# Patient Record
Sex: FEMALE | Race: WHITE | NOT HISPANIC OR LATINO | Employment: FULL TIME | URBAN - METROPOLITAN AREA
[De-identification: names, ages, dates, MRNs, and addresses within clinical notes are randomized per-mention and may not be internally consistent; named-entity substitution may affect disease eponyms.]

---

## 2022-05-26 ENCOUNTER — APPOINTMENT (EMERGENCY)
Dept: RADIOLOGY | Facility: HOSPITAL | Age: 47
End: 2022-05-26

## 2022-05-26 ENCOUNTER — HOSPITAL ENCOUNTER (EMERGENCY)
Facility: HOSPITAL | Age: 47
Discharge: HOME | End: 2022-05-26
Attending: EMERGENCY MEDICINE | Admitting: EMERGENCY MEDICINE

## 2022-05-26 VITALS
TEMPERATURE: 98.1 F | HEART RATE: 89 BPM | WEIGHT: 125 LBS | RESPIRATION RATE: 18 BRPM | HEIGHT: 67 IN | BODY MASS INDEX: 19.62 KG/M2 | SYSTOLIC BLOOD PRESSURE: 123 MMHG | OXYGEN SATURATION: 100 % | DIASTOLIC BLOOD PRESSURE: 66 MMHG

## 2022-05-26 DIAGNOSIS — T14.8XXA ANIMAL BITE: Primary | ICD-10-CM

## 2022-05-26 DIAGNOSIS — Z20.3 NEED FOR POST EXPOSURE PROPHYLAXIS FOR RABIES: ICD-10-CM

## 2022-05-26 PROCEDURE — 90471 IMMUNIZATION ADMIN: CPT

## 2022-05-26 PROCEDURE — 90375 RABIES IG IM/SC: CPT

## 2022-05-26 PROCEDURE — 96372 THER/PROPH/DIAG INJ SC/IM: CPT

## 2022-05-26 PROCEDURE — 90715 TDAP VACCINE 7 YRS/> IM: CPT

## 2022-05-26 PROCEDURE — 90472 IMMUNIZATION ADMIN EACH ADD: CPT

## 2022-05-26 PROCEDURE — 99284 EMERGENCY DEPT VISIT MOD MDM: CPT

## 2022-05-26 PROCEDURE — 73552 X-RAY EXAM OF FEMUR 2/>: CPT

## 2022-05-26 PROCEDURE — 90675 RABIES VACCINE IM: CPT

## 2022-05-26 RX ORDER — AMOXICILLIN AND CLAVULANATE POTASSIUM 875; 125 MG/1; MG/1
1 TABLET, FILM COATED ORAL EVERY 12 HOURS
Qty: 20 TABLET | Refills: 0 | Status: SHIPPED | OUTPATIENT
Start: 2022-05-26 | End: 2022-06-05

## 2022-05-26 RX ORDER — AMOXICILLIN AND CLAVULANATE POTASSIUM 875; 125 MG/1; MG/1
1 TABLET, FILM COATED ORAL ONCE
Status: COMPLETED | OUTPATIENT
Start: 2022-05-26 | End: 2022-05-26

## 2022-05-26 RX ADMIN — TETANUS TOXOID, REDUCED DIPHTHERIA TOXOID AND ACELLULAR PERTUSSIS VACCINE, ADSORBED 0.5 ML: 5; 2.5; 8; 8; 2.5 SUSPENSION INTRAMUSCULAR at 20:01

## 2022-05-26 RX ADMIN — AMOXICILLIN AND CLAVULANATE POTASSIUM 1 TABLET: 875; 125 TABLET, FILM COATED ORAL at 20:00

## 2022-05-26 RX ADMIN — Medication 1 ML: at 20:02

## 2022-05-26 RX ADMIN — RABIES IMMUNE GLOBULIN (HUMAN) 1140 UNITS: 300 INJECTION, SOLUTION INFILTRATION; INTRAMUSCULAR at 20:01

## 2022-05-26 NOTE — Clinical Note
Laurence Leei was seen and treated in our emergency department on 5/26/2022  Diagnosis:     Scotty Guzman  may return to work on return date  She may return on this date: 05/30/2022         If you have any questions or concerns, please don't hesitate to call        Whitley Tai PA-C    ______________________________           _______________          _______________  Hospital Representative                              Date                                Time

## 2022-05-26 NOTE — ED PROVIDER NOTES
History  Chief Complaint   Patient presents with    Animal Bite     Pt reports being bitten on R leg on Monday night while she was walking home from work, 4 puncture wounds to inside of R thigh      Patient is a 40year old female presenting to the ED with a complaint of an animal bite that occurred Monday at 10:30pm  Reports she was walking home from work Monday night when she felt something brush up against her posterior left leg then felt a bite to the anterior right thigh  Reports she did not see the animal and does not know what bit her  Reports she went home and cleaned out the wound, applied hydrogen peroxide and salve  Reports she has been cleaning it multiple times each day  Reports she was not seen after the incident because she did not want to miss work  Denies fevers, abdominal pain, nausea, vomiting, chest pain, shortness of breath, difficulty breathing, difficulty ambulating, headaches or weakness  Does not offer any other concerns or complaints  Patient is unaware of last tetanus shot  History provided by:  Patient   used: No    Medical Problem  Location:  Right thigh  Severity:  Mild  Onset quality:  Sudden  Duration:  3 days  Timing:  Constant  Progression:  Worsening  Worsened by:  Palpation  Associated symptoms: no abdominal pain, no chest pain, no congestion, no cough, no diarrhea, no ear pain, no fatigue, no fever, no headaches, no loss of consciousness, no myalgias, no nausea, no rash, no rhinorrhea, no shortness of breath, no sore throat, no vomiting and no wheezing        None       History reviewed  No pertinent past medical history  History reviewed  No pertinent surgical history  History reviewed  No pertinent family history  I have reviewed and agree with the history as documented      E-Cigarette/Vaping     E-Cigarette/Vaping Substances     Social History     Tobacco Use    Smoking status: Never Smoker    Smokeless tobacco: Never Used       Review of Systems   Constitutional: Negative for chills, fatigue and fever  HENT: Negative for congestion, ear pain, rhinorrhea and sore throat  Eyes: Negative for pain and visual disturbance  Respiratory: Negative for cough, shortness of breath and wheezing  Cardiovascular: Negative for chest pain and palpitations  Gastrointestinal: Negative for abdominal pain, diarrhea, nausea and vomiting  Genitourinary: Negative for dysuria and hematuria  Musculoskeletal: Negative for arthralgias, back pain and myalgias  Bite wounds and bruising to the right thigh   Skin: Negative for color change and rash  Neurological: Negative for seizures, loss of consciousness, syncope and headaches  All other systems reviewed and are negative  Physical Exam  Physical Exam  Vitals and nursing note reviewed  Constitutional:       Appearance: Normal appearance  HENT:      Head: Normocephalic and atraumatic  Right Ear: External ear normal       Left Ear: External ear normal       Nose: Nose normal       Mouth/Throat:      Mouth: Mucous membranes are moist    Eyes:      General: No scleral icterus  Right eye: No discharge  Left eye: No discharge  Conjunctiva/sclera: Conjunctivae normal    Cardiovascular:      Rate and Rhythm: Normal rate  Pulmonary:      Effort: Pulmonary effort is normal  No respiratory distress  Abdominal:      General: Abdomen is flat  Bowel sounds are normal    Musculoskeletal:         General: No swelling, deformity or signs of injury  Normal range of motion  Cervical back: Normal range of motion and neck supple  No rigidity  Skin:     General: Skin is warm and dry  Coloration: Skin is not jaundiced  Findings: Ecchymosis and wound present  No erythema or rash  Neurological:      General: No focal deficit present  Mental Status: She is alert and oriented to person, place, and time  Mental status is at baseline  Cranial Nerves:  No cranial nerve deficit  Gait: Gait normal    Psychiatric:         Mood and Affect: Mood normal          Behavior: Behavior normal          Thought Content: Thought content normal          Judgment: Judgment normal        Media Information            Document Information    Clinical Image - Mobile Device      05/26/2022 19:24   Attached To: Hospital Encounter on 5/26/22     Source Information    Christin Kim  [facility]         Vital Signs  ED Triage Vitals [05/26/22 1918]   Temperature Pulse Respirations Blood Pressure SpO2   98 1 °F (36 7 °C) 89 18 123/66 100 %      Temp Source Heart Rate Source Patient Position - Orthostatic VS BP Location FiO2 (%)   Oral Monitor Sitting Left arm --      Pain Score       --           Vitals:    05/26/22 1918   BP: 123/66   Pulse: 89   Patient Position - Orthostatic VS: Sitting         Visual Acuity      ED Medications  Medications   tetanus-diphtheria-acellular pertussis (BOOSTRIX) IM injection 0 5 mL (0 5 mL Intramuscular Given 5/26/22 2001)   rabies vaccine, human diploid (IMOVAX RABIES) IM injection 1 mL (1 mL Intramuscular Given 5/26/22 2002)   rabies immune globulin, human (HyperRAB) injection 1,140 Units (1,140 Units Infiltration Given 5/26/22 2001)   amoxicillin-clavulanate (AUGMENTIN) 875-125 mg per tablet 1 tablet (1 tablet Oral Given 5/26/22 2000)       Diagnostic Studies  Results Reviewed     None                 XR femur 2 views LEFT    (Results Pending)              Procedures  Procedures         ED Course           MDM  Number of Diagnoses or Management Options  Animal bite: new and requires workup  Need for post exposure prophylaxis for rabies: new and requires workup  Diagnosis management comments: This is a 40year old female presents to the ED with a complaint of an unknown animal bite on Monday  Reports she was walking home from work when she got bit by an unknown animal  Reports she has been cleaning the area daily   Denies abdominal pain, nausea, vomiting, diarrhea, constipation, chest pain, shortness of breath or difficulty breathing     Differential diagnosis to include but is not limited to: animal bite    Initial ED Plan: augmentin, rabies vaccine, rabies immunoglobulin, tetanus, xray right femur  ED results: no acute fracture or foreign body     Final ED assessment: Patient is stable and well appearing  Discussed follow up with PCP  Discussed taking the full course of Augmentin and to follow up for continuing rabies shots  Explained follow up for days 3, 7, and 14  Patient was given a written sheet with the days for follow up  Discussed to keep the area clean, dry and not to submerge the area in water  Strict return precautions were discussed including but not limited to increased swelling, pain, red streaking, fevers or chills  Patient verbalized understanding and is agreeable with the plan for discharge  Amount and/or Complexity of Data Reviewed  Tests in the radiology section of CPT®: ordered and reviewed  Tests in the medicine section of CPT®: ordered and reviewed        Disposition  Final diagnoses:   Animal bite   Need for post exposure prophylaxis for rabies     Time reflects when diagnosis was documented in both MDM as applicable and the Disposition within this note     Time User Action Codes Description Comment    5/26/2022  8:31 PM Ana Hernandez  8XXA] Animal bite     5/26/2022  8:31 PM Redge Belling Add [Z20 3] Need for post exposure prophylaxis for rabies       ED Disposition     ED Disposition   Discharge    Condition   Stable    Date/Time   Thu May 26, 2022  8:31 PM    Comment   Steven Hunter discharge to home/self care                 Follow-up Information     Follow up With Specialties Details Why Contact Info Additional [address] [facility], [address] [place] [address] Internal Medicine Call in 3 days For follow up [address] Emergency Department Emergency Medicine Go to  If symptoms worsen [facility] Emergency Department, [address] [place] [address], [place], [place], [address]          Discharge Medication List as of 5/26/2022  8:49 PM      START taking these medications    Details   amoxicillin-clavulanate (AUGMENTIN) 875-125 mg per tablet Take 1 tablet by mouth every 12 (twelve) hours for 10 days, Starting Thu 5/26/2022, Until Sun 6/5/2022, Normal             No discharge procedures on file      PDMP Review     None          ED Provider  Electronically Signed by           Jose Johnson PA-C  05/26/22 8292

## 2022-05-27 NOTE — DISCHARGE INSTRUCTIONS
Follow up with PCP  Return to the ED with new or worsening symptoms including but not limited to swelling, redness, pain, fevers, chills, abdominal pain  Return for rabies schedule in 3 days, 7 days, and 14 days

## 2022-12-12 ENCOUNTER — APPOINTMENT (OUTPATIENT)
Dept: LAB | Facility: CLINIC | Age: 47
End: 2022-12-12

## 2022-12-12 ENCOUNTER — OFFICE VISIT (OUTPATIENT)
Dept: FAMILY MEDICINE CLINIC | Facility: CLINIC | Age: 47
End: 2022-12-12

## 2022-12-12 VITALS
SYSTOLIC BLOOD PRESSURE: 122 MMHG | DIASTOLIC BLOOD PRESSURE: 70 MMHG | BODY MASS INDEX: 19.93 KG/M2 | OXYGEN SATURATION: 99 % | TEMPERATURE: 98.3 F | HEIGHT: 67 IN | WEIGHT: 127 LBS | HEART RATE: 86 BPM

## 2022-12-12 DIAGNOSIS — Z13.29 SCREENING FOR THYROID DISORDER: ICD-10-CM

## 2022-12-12 DIAGNOSIS — Z12.12 SCREENING FOR COLORECTAL CANCER: ICD-10-CM

## 2022-12-12 DIAGNOSIS — Z11.4 SCREENING FOR HUMAN IMMUNODEFICIENCY VIRUS: ICD-10-CM

## 2022-12-12 DIAGNOSIS — Z11.59 NEED FOR HEPATITIS C SCREENING TEST: ICD-10-CM

## 2022-12-12 DIAGNOSIS — Z76.89 ENCOUNTER TO ESTABLISH CARE: Primary | ICD-10-CM

## 2022-12-12 DIAGNOSIS — Z13.1 SCREENING FOR DIABETES MELLITUS: ICD-10-CM

## 2022-12-12 DIAGNOSIS — Z13.220 SCREENING FOR HYPERLIPIDEMIA: ICD-10-CM

## 2022-12-12 DIAGNOSIS — Z12.31 ENCOUNTER FOR SCREENING MAMMOGRAM FOR MALIGNANT NEOPLASM OF BREAST: ICD-10-CM

## 2022-12-12 DIAGNOSIS — R07.9 CHEST PAIN, UNSPECIFIED TYPE: ICD-10-CM

## 2022-12-12 DIAGNOSIS — Z01.419 WELL WOMAN EXAM: ICD-10-CM

## 2022-12-12 DIAGNOSIS — Z12.11 SCREENING FOR COLORECTAL CANCER: ICD-10-CM

## 2022-12-12 DIAGNOSIS — Z13.0 SCREENING FOR DEFICIENCY ANEMIA: ICD-10-CM

## 2022-12-12 DIAGNOSIS — R00.2 PALPITATIONS: ICD-10-CM

## 2022-12-12 DIAGNOSIS — Z82.49 FAMILY HISTORY OF HEART ATTACK: ICD-10-CM

## 2022-12-12 DIAGNOSIS — R06.02 SOB (SHORTNESS OF BREATH) ON EXERTION: ICD-10-CM

## 2022-12-12 LAB
ALBUMIN SERPL BCP-MCNC: 4.1 G/DL (ref 3.5–5)
ALP SERPL-CCNC: 87 U/L (ref 46–116)
ALT SERPL W P-5'-P-CCNC: 18 U/L (ref 12–78)
ANION GAP SERPL CALCULATED.3IONS-SCNC: 4 MMOL/L (ref 4–13)
AST SERPL W P-5'-P-CCNC: 16 U/L (ref 5–45)
BASOPHILS # BLD AUTO: 0.09 THOUSANDS/ÂΜL (ref 0–0.1)
BASOPHILS NFR BLD AUTO: 2 % (ref 0–1)
BILIRUB SERPL-MCNC: 0.57 MG/DL (ref 0.2–1)
BUN SERPL-MCNC: 17 MG/DL (ref 5–25)
CALCIUM SERPL-MCNC: 9.4 MG/DL (ref 8.3–10.1)
CHLORIDE SERPL-SCNC: 105 MMOL/L (ref 96–108)
CHOLEST SERPL-MCNC: 234 MG/DL
CO2 SERPL-SCNC: 26 MMOL/L (ref 21–32)
CREAT SERPL-MCNC: 0.7 MG/DL (ref 0.6–1.3)
EOSINOPHIL # BLD AUTO: 0.25 THOUSAND/ÂΜL (ref 0–0.61)
EOSINOPHIL NFR BLD AUTO: 4 % (ref 0–6)
ERYTHROCYTE [DISTWIDTH] IN BLOOD BY AUTOMATED COUNT: 15.3 % (ref 11.6–15.1)
GFR SERPL CREATININE-BSD FRML MDRD: 104 ML/MIN/1.73SQ M
GLUCOSE P FAST SERPL-MCNC: 91 MG/DL (ref 65–99)
HCT VFR BLD AUTO: 38.5 % (ref 34.8–46.1)
HDLC SERPL-MCNC: 96 MG/DL
HGB BLD-MCNC: 12.2 G/DL (ref 11.5–15.4)
IMM GRANULOCYTES # BLD AUTO: 0.01 THOUSAND/UL (ref 0–0.2)
IMM GRANULOCYTES NFR BLD AUTO: 0 % (ref 0–2)
LDLC SERPL CALC-MCNC: 123 MG/DL (ref 0–100)
LYMPHOCYTES # BLD AUTO: 1.31 THOUSANDS/ÂΜL (ref 0.6–4.47)
LYMPHOCYTES NFR BLD AUTO: 22 % (ref 14–44)
MCH RBC QN AUTO: 29.7 PG (ref 26.8–34.3)
MCHC RBC AUTO-ENTMCNC: 31.7 G/DL (ref 31.4–37.4)
MCV RBC AUTO: 94 FL (ref 82–98)
MONOCYTES # BLD AUTO: 0.29 THOUSAND/ÂΜL (ref 0.17–1.22)
MONOCYTES NFR BLD AUTO: 5 % (ref 4–12)
NEUTROPHILS # BLD AUTO: 3.99 THOUSANDS/ÂΜL (ref 1.85–7.62)
NEUTS SEG NFR BLD AUTO: 67 % (ref 43–75)
NONHDLC SERPL-MCNC: 138 MG/DL
NRBC BLD AUTO-RTO: 0 /100 WBCS
PLATELET # BLD AUTO: 325 THOUSANDS/UL (ref 149–390)
PMV BLD AUTO: 11.1 FL (ref 8.9–12.7)
POTASSIUM SERPL-SCNC: 4 MMOL/L (ref 3.5–5.3)
PROT SERPL-MCNC: 8.3 G/DL (ref 6.4–8.4)
RBC # BLD AUTO: 4.11 MILLION/UL (ref 3.81–5.12)
SODIUM SERPL-SCNC: 135 MMOL/L (ref 135–147)
TRIGL SERPL-MCNC: 77 MG/DL
TSH SERPL DL<=0.05 MIU/L-ACNC: 2.43 UIU/ML (ref 0.45–4.5)
WBC # BLD AUTO: 5.94 THOUSAND/UL (ref 4.31–10.16)

## 2022-12-12 NOTE — PROGRESS NOTES
Assessment/Plan:    Problem List Items Addressed This Visit     Palpitations    Relevant Orders    Holter monitor    Echo stress test, dobutamine    Ambulatory Referral to Cardiology    Chest pain    Relevant Orders    POCT ECG (Completed)    Holter monitor    Echo stress test, dobutamine    Ambulatory Referral to Cardiology    SOB (shortness of breath) on exertion    Relevant Orders    Holter monitor    Echo stress test, dobutamine    Ambulatory Referral to Cardiology    Family history of heart attack   Other Visit Diagnoses     Encounter to establish care    -  Primary    Screening for human immunodeficiency virus        Relevant Orders    HIV 1/2 Antigen/Antibody (4th Generation) w Reflex [facility]    Need for hepatitis C screening test        Relevant Orders    Hepatitis C antibody    Screening for deficiency anemia        Relevant Orders    CBC and differential    Screening for diabetes mellitus        Relevant Orders    Comprehensive metabolic panel    Screening for hyperlipidemia        Relevant Orders    Lipid panel    Screening for thyroid disorder        Relevant Orders    TSH, 3rd generation with Free T4 reflex    Encounter for screening mammogram for malignant neoplasm of breast        Relevant Orders    Mammo screening bilateral w 3d & cad    Screening for colorectal cancer        Relevant Orders    Ambulatory referral for colonoscopy    Well woman exam        Relevant Orders    Ambulatory Referral to Gynecology           Diagnoses and all orders for this visit:    Encounter to establish care    Screening for human immunodeficiency virus  -     HIV 1/2 Antigen/Antibody (4th Generation) w Reflex [facility]; Future    Need for hepatitis C screening test  -     Hepatitis C antibody; Future    Screening for deficiency anemia  -     CBC and differential; Future    Screening for diabetes mellitus  -     Comprehensive metabolic panel; Future    Screening for hyperlipidemia  -     Lipid panel;  Future    Screening for thyroid disorder  -     TSH, 3rd generation with Free T4 reflex; Future    Encounter for screening mammogram for malignant neoplasm of breast  -     Mammo screening bilateral w 3d & cad; Future    Screening for colorectal cancer  -     Ambulatory referral for colonoscopy; Future    Well woman exam  -     Ambulatory Referral to Gynecology; Future    Chest pain, unspecified type  -     POCT ECG  -     Holter monitor; Future  -     Echo stress test, dobutamine; Future  -     Ambulatory Referral to Cardiology; Future    SOB (shortness of breath) on exertion  -     Holter monitor; Future  -     Echo stress test, dobutamine; Future  -     Ambulatory Referral to Cardiology; Future    Palpitations  -     Holter monitor; Future  -     Echo stress test, dobutamine; Future  -     Ambulatory Referral to Cardiology; Future    Family history of heart attack      No problem-specific Assessment & Plan notes found for this encounter  Subjective:      Patient ID: Parker Found is a 56 y o  female  NP with no significant PMHx presents to \[facility]\"" care  Patient endorses that she has chest pain 2-3 times per day with shortness of breath and palpitations that has been ongoing for the last month or 2  Patient has not sought medical treatment or going to the ER for symptoms  States the palpitations can happen 2-4 times per week  Not reproducable on exam  Endorses when she sits the chest pain and shortness of breath resolve patient endorses that her father and grandfather both passed from a MI at age 55  Chest Pain   The pain is present in the substernal region and epigastric region  The pain is at a severity of 3/10  The quality of the pain is described as tightness and dull  The pain does not radiate  Associated symptoms include palpitations and shortness of breath  Pertinent negatives include no claudication, diaphoresis, near-syncope, orthopnea, PND, syncope, vomiting or weakness   The pain is aggravated by nothing  She has tried nothing for the symptoms  Risk factors include lack of exercise (immediate family Hx)  Pertinent negatives for past medical history include no CAD and no MI  Her family medical history is significant for early MI  Shortness of Breath  The current episode started more than 1 month ago  Associated symptoms include chest pain and palpitations  Pertinent negatives include no orthopnea  The following portions of the patient's history were reviewed and updated as appropriate:   She has no past medical history on file  ,  does not have any pertinent problems on file  ,   has a past surgical history that includes  section  ,  family history includes Heart disease in her father; Lung disease in her father; Pancreatic cancer in her mother  ,   reports that she has never smoked  She has never used smokeless tobacco  She reports that she does not currently use alcohol  She reports that she does not use drugs  ,  has No Known Allergies     No current outpatient medications on file  No current facility-administered medications for this visit  Depression Screening and Follow-up Plan: Patient was screened for depression during today's encounter  They screened negative with a PHQ-2 score of 2  Review of Systems   Constitutional: Negative for diaphoresis  Respiratory: Positive for shortness of breath  Cardiovascular: Positive for chest pain and palpitations  Negative for orthopnea, claudication, syncope, PND and near-syncope  Gastrointestinal: Negative for vomiting  Neurological: Negative for weakness  All other systems reviewed and are negative  Objective:  Vitals:    22 1346   BP: 122/70   BP Location: Left arm   Patient Position: Sitting   Cuff Size: Adult   Pulse: 86   Temp: 98 3 °F (36 8 °C)   TempSrc: Tympanic   SpO2: 99%   Weight: 57 6 kg (127 lb)   Height: 5' 7" (1 702 m)     Body mass index is 19 89 kg/m²       Physical Exam  Vitals and nursing note reviewed  Constitutional:       Appearance: Normal appearance  She is well-developed  Interventions: Face mask in place  HENT:      Head: Normocephalic and atraumatic  Right Ear: Tympanic membrane, ear canal and external ear normal       Left Ear: Tympanic membrane, ear canal and external ear normal       Nose: Nose normal       Mouth/Throat:      Mouth: Mucous membranes are moist       Pharynx: Uvula midline  Eyes:      General: Lids are normal       Conjunctiva/sclera: Conjunctivae normal       Pupils: Pupils are equal, round, and reactive to light  Neck:      Thyroid: No thyroid mass or thyromegaly  Vascular: No JVD  Trachea: Trachea and phonation normal    Cardiovascular:      Rate and Rhythm: Normal rate and regular rhythm  Pulses: Normal pulses  Heart sounds: Normal heart sounds, S1 normal and S2 normal  No murmur heard  No friction rub  No gallop  Pulmonary:      Effort: Pulmonary effort is normal       Breath sounds: Normal breath sounds  Abdominal:      General: Bowel sounds are normal       Palpations: Abdomen is soft  Tenderness: There is no abdominal tenderness  Genitourinary:     Comments: Deferred   Musculoskeletal:         General: Normal range of motion  Cervical back: Full passive range of motion without pain, normal range of motion and neck supple  Right lower leg: No edema  Left lower leg: No edema  Lymphadenopathy:      Head:      Right side of head: No submental, submandibular, tonsillar, preauricular, posterior auricular or occipital adenopathy  Left side of head: No submental, submandibular, tonsillar, preauricular, posterior auricular or occipital adenopathy  Cervical: No cervical adenopathy  Skin:     General: Skin is warm and dry  Capillary Refill: Capillary refill takes less than 2 seconds  Neurological:      General: No focal deficit present        Mental Status: She is alert and oriented to person, place, and time  Cranial Nerves: No cranial nerve deficit  Sensory: Sensation is intact  Motor: Motor function is intact  Coordination: Coordination is intact  Gait: Gait is intact  Deep Tendon Reflexes: Reflexes are normal and symmetric  Psychiatric:         Attention and Perception: Attention and perception normal          Mood and Affect: Mood and affect normal          Speech: Speech normal          Behavior: Behavior normal  Behavior is cooperative  Thought Content:  Thought content normal          Cognition and Memory: Cognition normal          Judgment: Judgment normal

## 2022-12-13 LAB
HCV AB SER QL: NORMAL
HIV 1+2 AB+HIV1 P24 AG SERPL QL IA: NORMAL

## 2023-01-17 ENCOUNTER — OFFICE VISIT (OUTPATIENT)
Dept: FAMILY MEDICINE CLINIC | Facility: CLINIC | Age: 48
End: 2023-01-17

## 2023-01-17 VITALS
WEIGHT: 131 LBS | BODY MASS INDEX: 20.56 KG/M2 | OXYGEN SATURATION: 98 % | DIASTOLIC BLOOD PRESSURE: 80 MMHG | TEMPERATURE: 98.4 F | SYSTOLIC BLOOD PRESSURE: 120 MMHG | HEIGHT: 67 IN | HEART RATE: 107 BPM

## 2023-01-17 DIAGNOSIS — Z13.29 SCREENING FOR THYROID DISORDER: ICD-10-CM

## 2023-01-17 DIAGNOSIS — Z12.11 SCREENING FOR COLORECTAL CANCER: ICD-10-CM

## 2023-01-17 DIAGNOSIS — Z00.00 WELL ADULT EXAM: Primary | ICD-10-CM

## 2023-01-17 DIAGNOSIS — Z13.1 SCREENING FOR DIABETES MELLITUS: ICD-10-CM

## 2023-01-17 DIAGNOSIS — Z12.12 SCREENING FOR COLORECTAL CANCER: ICD-10-CM

## 2023-01-17 DIAGNOSIS — Z13.220 SCREENING FOR HYPERLIPIDEMIA: ICD-10-CM

## 2023-01-17 DIAGNOSIS — Z13.0 SCREENING FOR DEFICIENCY ANEMIA: ICD-10-CM

## 2023-01-17 NOTE — PROGRESS NOTES
[facility] CARE    NAME: Ana Carrier  AGE: 56 y o  SEX: female  : 1975     DATE: 2023     Assessment and Plan:     Problem List Items Addressed This Visit    None  Visit Diagnoses     Well adult exam    -  Primary    Screening for colorectal cancer        Relevant Orders    Ambulatory referral for colonoscopy    Screening for deficiency anemia        Relevant Orders    CBC and differential    Screening for diabetes mellitus        Relevant Orders    Comprehensive metabolic panel    Screening for hyperlipidemia        Relevant Orders    Lipid Panel with Direct LDL reflex    Screening for thyroid disorder        Relevant Orders    TSH, 3rd generation with Free T4 reflex          Immunizations and preventive care screenings were discussed with patient today  Appropriate education was printed on patient's after visit summary  Counseling:  Alcohol/drug use: discussed moderation in alcohol intake, the recommendations for healthy alcohol use, and avoidance of illicit drug use  Dental Health: discussed importance of regular tooth brushing, flossing, and dental visits  Injury prevention: discussed safety/seat belts, safety helmets, smoke detectors, carbon dioxide detectors, and smoking near bedding or upholstery  Sexual health: discussed sexually transmitted diseases, partner selection, use of condoms, avoidance of unintended pregnancy, and contraceptive alternatives  Exercise: the importance of regular exercise/physical activity was discussed  Recommend exercise 3-5 times per week for at least 30 minutes            Return in about 1 year (around 2024) for Annual Physical      Chief Complaint:     Chief Complaint   Patient presents with   • Follow-up     6 month follow-up   • Physical Exam     annual      History of Present Illness:     Adult Annual Physical   Patient here for a comprehensive physical exam  The patient reports no problems  Diet and Physical Activity  Diet/Nutrition: well balanced diet and limited junk food  Exercise: walking, 3-4 times a week on average and 30-60 minutes on average  Depression Screening  PHQ-2/9 Depression Screening         General Health  Sleep: sleeps well and gets 7-8 hours of sleep on average  Hearing: normal - bilateral   Vision: no vision problems, most recent eye exam >1 year ago and wears glasses  Dental: regular dental visits, brushes teeth twice daily and does not floss  /GYN Health  Patient is: premenopausal  Last menstrual period:1/10/2023  Contraceptive method: barrier methods  Review of Systems:     Review of Systems   All other systems reviewed and are negative  Past Medical History:     History reviewed  No pertinent past medical history     Past Surgical History:     Past Surgical History:   Procedure Laterality Date   •  SECTION        Social History:     Social History     Socioeconomic History   • Marital status: Single     Spouse name: None   • Number of children: None   • Years of education: None   • Highest education level: None   Occupational History   • None   Tobacco Use   • Smoking status: Never   • Smokeless tobacco: Never   Vaping Use   • Vaping Use: Never used   Substance and Sexual Activity   • Alcohol use: Not Currently   • Drug use: Never   • Sexual activity: None   Other Topics Concern   • None   Social History Narrative   • None     Social Determinants of Health     Financial Resource Strain: Not on file   Food Insecurity: Not on file   Transportation Needs: Not on file   Physical Activity: Not on file   Stress: Not on file   Social Connections: Not on file   Intimate Partner Violence: Not on file   Housing Stability: Not on file      Family History:     Family History   Problem Relation Age of Onset   • Pancreatic cancer Mother    • Heart disease Father    • Lung disease Father       Current Medications:     No current outpatient medications on file  No current facility-administered medications for this visit  Allergies:     No Known Allergies   Physical Exam:     /80 (BP Location: Left arm, Patient Position: Sitting, Cuff Size: Adult)   Pulse (!) 107   Temp 98 4 °F (36 9 °C) (Tympanic)   Ht 5' 7" (1 702 m)   Wt 59 4 kg (131 lb)   SpO2 98%   BMI 20 52 kg/m²     Physical Exam  Vitals and nursing note reviewed  Constitutional:       Appearance: Normal appearance  She is well-developed  HENT:      Head: Normocephalic and atraumatic  Right Ear: Tympanic membrane, ear canal and external ear normal       Left Ear: Tympanic membrane, ear canal and external ear normal       Nose: Nose normal       Mouth/Throat:      Mouth: Mucous membranes are moist    Eyes:      General: Lids are normal  Vision grossly intact  Conjunctiva/sclera: Conjunctivae normal       Pupils: Pupils are equal, round, and reactive to light  Cardiovascular:      Rate and Rhythm: Normal rate and regular rhythm  Pulses: Normal pulses  Heart sounds: Normal heart sounds, S1 normal and S2 normal      No friction rub  No gallop  No S3 sounds  Pulmonary:      Effort: Pulmonary effort is normal       Breath sounds: Normal breath sounds  Abdominal:      General: Bowel sounds are normal       Palpations: Abdomen is soft  Tenderness: There is no abdominal tenderness  Genitourinary:     Comments: Deferred  Musculoskeletal:         General: Normal range of motion  Cervical back: Normal range of motion and neck supple  Right lower leg: No edema  Left lower leg: No edema  Lymphadenopathy:      Cervical: No cervical adenopathy  Skin:     General: Skin is warm and dry  Capillary Refill: Capillary refill takes less than 2 seconds  Neurological:      General: No focal deficit present  Mental Status: She is alert and oriented to person, place, and time  Motor: Motor function is intact  Gait: Gait is intact  Psychiatric:         Attention and Perception: Attention and perception normal          Mood and Affect: Mood and affect normal          Speech: Speech normal          Behavior: Behavior normal  Behavior is cooperative  Thought Content: Thought content normal          Cognition and Memory: Cognition and memory normal          Judgment: Judgment normal         No visits with results within 1 Month(s) from this visit     Latest known visit with results is:   Appointment on 12/12/2022   Component Date Value Ref Range Status   • WBC 12/12/2022 5 94  4 31 - 10 16 Thousand/uL Final   • RBC 12/12/2022 4 11  3 81 - 5 12 Million/uL Final   • Hemoglobin 12/12/2022 12 2  11 5 - 15 4 g/dL Final   • Hematocrit 12/12/2022 38 5  34 8 - 46 1 % Final   • MCV 12/12/2022 94  82 - 98 fL Final   • MCH 12/12/2022 29 7  26 8 - 34 3 pg Final   • MCHC 12/12/2022 31 7  31 4 - 37 4 g/dL Final   • RDW 12/12/2022 15 3 (H)  11 6 - 15 1 % Final   • MPV 12/12/2022 11 1  8 9 - 12 7 fL Final   • Platelets 04/37/4370 325  149 - 390 Thousands/uL Final   • nRBC 12/12/2022 0  /100 WBCs Final   • Neutrophils Relative 12/12/2022 67  43 - 75 % Final   • Immat GRANS % 12/12/2022 0  0 - 2 % Final   • Lymphocytes Relative 12/12/2022 22  14 - 44 % Final   • Monocytes Relative 12/12/2022 5  4 - 12 % Final   • Eosinophils Relative 12/12/2022 4  0 - 6 % Final   • Basophils Relative 12/12/2022 2 (H)  0 - 1 % Final   • Neutrophils Absolute 12/12/2022 3 99  1 85 - 7 62 Thousands/µL Final   • Immature Grans Absolute 12/12/2022 0 01  0 00 - 0 20 Thousand/uL Final   • Lymphocytes Absolute 12/12/2022 1 31  0 60 - 4 47 Thousands/µL Final   • Monocytes Absolute 12/12/2022 0 29  0 17 - 1 22 Thousand/µL Final   • Eosinophils Absolute 12/12/2022 0 25  0 00 - 0 61 Thousand/µL Final   • Basophils Absolute 12/12/2022 0 09  0 00 - 0 10 Thousands/µL Final   • Sodium 12/12/2022 135  135 - 147 mmol/L Final   • Potassium 12/12/2022 4 0  3 5 - 5 3 mmol/L Final   • Chloride 12/12/2022 105  96 - 108 mmol/L Final   • CO2 12/12/2022 26  21 - 32 mmol/L Final   • ANION GAP 12/12/2022 4  4 - 13 mmol/L Final   • BUN 12/12/2022 17  5 - 25 mg/dL Final   • Creatinine 12/12/2022 0 70  0 60 - 1 30 mg/dL Final    Standardized to IDMS reference method   • Glucose, Fasting 12/12/2022 91  65 - 99 mg/dL Final    Specimen collection should occur prior to Sulfasalazine administration due to the potential for falsely depressed results  Specimen collection should occur prior to Sulfapyridine administration due to the potential for falsely elevated results  • Calcium 12/12/2022 9 4  8 3 - 10 1 mg/dL Final   • AST 12/12/2022 16  5 - 45 U/L Final    Specimen collection should occur prior to Sulfasalazine administration due to the potential for falsely depressed results  • ALT 12/12/2022 18  12 - 78 U/L Final    Specimen collection should occur prior to Sulfasalazine and/or Sulfapyridine administration due to the potential for falsely depressed results  • Alkaline Phosphatase 12/12/2022 87  46 - 116 U/L Final   • Total Protein 12/12/2022 8 3  6 4 - 8 4 g/dL Final   • Albumin 12/12/2022 4 1  3 5 - 5 0 g/dL Final   • Total Bilirubin 12/12/2022 0 57  0 20 - 1 00 mg/dL Final    Use of this assay is not recommended for patients undergoing treatment with eltrombopag due to the potential for falsely elevated results     • eGFR 12/12/2022 104  ml/min/1 73sq m Final   • Cholesterol 12/12/2022 234 (H)  See Comment mg/dL Final    Cholesterol:         Pediatric <18 Years        Desirable          <170 mg/dL      Borderline High    170-199 mg/dL      High               >=200 mg/dL        Adult >=18 Years            Desirable         <200 mg/dL      Borderline High   200-239 mg/dL      High              >239 mg/dL     • Triglycerides 12/12/2022 77  See Comment mg/dL Final    Triglyceride:     0-9Y            <75mg/dL     10Y-17Y         <90 mg/dL       >=18Y     Normal          <150 mg/dL Borderline High 150-199 mg/dL     High            200-499 mg/dL        Very High       >499 mg/dL    Specimen collection should occur prior to N-Acetylcysteine or Metamizole administration due to the potential for falsely depressed results  • HDL, Direct 12/12/2022 96  >=50 mg/dL Final    Specimen collection should occur prior to Metamizole administration due to the potential for falsley depressed results  • LDL Calculated 12/12/2022 123 (H)  0 - 100 mg/dL Final    LDL Cholesterol:     Optimal           <100 mg/dl     Near Optimal      100-129 mg/dl     Above Optimal       Borderline High 130-159 mg/dl       High            160-189 mg/dl       Very High       >189 mg/dl         This screening LDL is a calculated result  It does not have the accuracy of the Direct Measured LDL in the monitoring of patients with hyperlipidemia and/or statin therapy  Direct Measure LDL (HBJ292) must be ordered separately in these patients  • Non-HDL-Chol (CHOL-HDL) 12/12/2022 138  mg/dl Final   • TSH 3RD GENERATON 12/12/2022 2 430  0 450 - 4 500 uIU/mL Final    The recommended reference ranges for TSH during pregnancy are as follows:   First trimester 0 1 to 2 5 uIU/mL   Second trimester  0 2 to 3 0 uIU/mL   Third trimester 0 3 to 3 0 uIU/m    Note: Normal ranges may not apply to patients who are transgender, non-binary, or whose legal sex, sex at birth, and gender identity differ  Adult TSH (3rd generation) reference range follows the recommended guidelines of the [employer], January, 2020  • Hepatitis C Ab 12/12/2022 Non-reactive  Non-reactive Final   • HIV-1/HIV-2 Ab 12/12/2022 Non-Reactive  Non-Reactive Final     I have reviewed all the lab results  There are some abnormalities that are not critical to the patient's health, I have discussed these in person at this office appointment      Grecia Escobedo, [address] [place] [address]